# Patient Record
Sex: FEMALE | Race: WHITE | NOT HISPANIC OR LATINO | URBAN - METROPOLITAN AREA
[De-identification: names, ages, dates, MRNs, and addresses within clinical notes are randomized per-mention and may not be internally consistent; named-entity substitution may affect disease eponyms.]

---

## 2017-03-13 ENCOUNTER — OFFICE VISIT (OUTPATIENT)
Dept: FAMILY MEDICINE CLINIC | Facility: CLINIC | Age: 27
End: 2017-03-13

## 2017-03-13 VITALS
SYSTOLIC BLOOD PRESSURE: 110 MMHG | DIASTOLIC BLOOD PRESSURE: 70 MMHG | OXYGEN SATURATION: 98 % | HEART RATE: 110 BPM | WEIGHT: 124 LBS | TEMPERATURE: 98.4 F | BODY MASS INDEX: 22.82 KG/M2 | HEIGHT: 62 IN

## 2017-03-13 DIAGNOSIS — J30.89 ALLERGIC RHINITIS DUE TO OTHER ALLERGIC TRIGGER, UNSPECIFIED RHINITIS SEASONALITY: ICD-10-CM

## 2017-03-13 DIAGNOSIS — J02.9 SORE THROAT: Primary | ICD-10-CM

## 2017-03-13 DIAGNOSIS — J06.9 ACUTE URI: ICD-10-CM

## 2017-03-13 LAB
EXPIRATION DATE: NORMAL
EXPIRATION DATE: NORMAL
HETEROPH AB SER QL LA: NEGATIVE
INTERNAL CONTROL: NORMAL
INTERNAL CONTROL: NORMAL
Lab: NORMAL
Lab: NORMAL
S PYO AG THROAT QL: NEGATIVE

## 2017-03-13 PROCEDURE — 86308 HETEROPHILE ANTIBODY SCREEN: CPT | Performed by: FAMILY MEDICINE

## 2017-03-13 PROCEDURE — 87880 STREP A ASSAY W/OPTIC: CPT | Performed by: FAMILY MEDICINE

## 2017-03-13 PROCEDURE — 99213 OFFICE O/P EST LOW 20 MIN: CPT | Performed by: FAMILY MEDICINE

## 2017-03-13 RX ORDER — DULOXETIN HYDROCHLORIDE 60 MG/1
CAPSULE, DELAYED RELEASE ORAL
COMMUNITY
Start: 2017-03-09 | End: 2017-12-19

## 2017-03-13 NOTE — PROGRESS NOTES
Subjective   Jocelynn Montague is a 27 y.o. female.   Chief Complaint   Patient presents with   • Sore Throat       History of Present Illness     #1 sore throat for 10 days, on and off.  No fever, no chills associated.  Patient has nasal congestion and clear nasal drainage alternating with yellow drainage.  She has dry cough to clear her throat.  No other symptoms associated.  She tried Mucinex and Flonase as recommended in urgent care where she was checked one week ago.  It helped with her voice and a little with the drainage.    The following portions of the patient's history were reviewed and updated as appropriate: allergies, current medications, past medical history, past social history and problem list.    Review of Systems   Constitutional: Negative for chills and fever.   HENT: Positive for congestion and sore throat.    Respiratory: Positive for cough.    Cardiovascular: Negative.          Objective   Wt Readings from Last 3 Encounters:   03/13/17 124 lb (56.2 kg)   09/20/16 124 lb (56.2 kg)   09/13/16 125 lb (56.7 kg)      Vitals:    03/13/17 1107   BP: 110/70   Pulse: 110   Temp: 98.4 °F (36.9 °C)   SpO2: 98%     Temp Readings from Last 3 Encounters:   03/13/17 98.4 °F (36.9 °C)   09/20/16 98.3 °F (36.8 °C)   09/13/16 98 °F (36.7 °C)     BP Readings from Last 3 Encounters:   03/13/17 110/70   09/20/16 100/60   09/13/16 120/70     Pulse Readings from Last 3 Encounters:   03/13/17 110   09/20/16 92   09/13/16 85       Physical Exam   Constitutional: She is oriented to person, place, and time. She appears well-developed and well-nourished.   HENT:   Head: Normocephalic and atraumatic.   Right Ear: Tympanic membrane, external ear and ear canal normal.   Left Ear: Tympanic membrane, external ear and ear canal normal.   Nose: Mucosal edema and rhinorrhea present.   Mouth/Throat: No oropharyngeal exudate or posterior oropharyngeal erythema.   Neck: Neck supple.   Cardiovascular: Normal rate, regular rhythm and  normal heart sounds.    Pulmonary/Chest: Effort normal and breath sounds normal.   Neurological: She is alert and oriented to person, place, and time.   Skin: Skin is warm, dry and intact.       Assessment/Plan   Jocelynn was seen today for sore throat.    Diagnoses and all orders for this visit:    Sore throat  -     POCT Infectious mononucleosis antibody  -     POCT rapid strep A    Acute URI    Allergic rhinitis due to other allergic trigger, unspecified rhinitis seasonality        #1 URI/ AR- strep test and mono test negative.  Patient will continue Flonase.  She can add Zyrtec if needed.  Return to clinic if symptoms are not better in a few days, or sooner if worsening.

## 2017-09-05 RX ORDER — ERGOCALCIFEROL 1.25 MG/1
CAPSULE ORAL
Qty: 13 CAPSULE | Refills: 0 | OUTPATIENT
Start: 2017-09-05

## 2017-12-19 ENCOUNTER — OFFICE VISIT (OUTPATIENT)
Dept: INTERNAL MEDICINE | Facility: CLINIC | Age: 27
End: 2017-12-19

## 2017-12-19 VITALS
DIASTOLIC BLOOD PRESSURE: 64 MMHG | HEIGHT: 62 IN | WEIGHT: 125.38 LBS | BODY MASS INDEX: 23.07 KG/M2 | SYSTOLIC BLOOD PRESSURE: 128 MMHG | HEART RATE: 89 BPM | TEMPERATURE: 97.6 F | OXYGEN SATURATION: 97 %

## 2017-12-19 DIAGNOSIS — J32.9 SINUSITIS, UNSPECIFIED CHRONICITY, UNSPECIFIED LOCATION: Primary | ICD-10-CM

## 2017-12-19 PROCEDURE — 99213 OFFICE O/P EST LOW 20 MIN: CPT | Performed by: NURSE PRACTITIONER

## 2017-12-19 RX ORDER — AMOXICILLIN 875 MG/1
875 TABLET, COATED ORAL EVERY 12 HOURS SCHEDULED
Qty: 20 TABLET | Refills: 0 | Status: SHIPPED | OUTPATIENT
Start: 2017-12-19 | End: 2018-03-13

## 2017-12-19 RX ORDER — NORETHINDRONE ACETATE AND ETHINYL ESTRADIOL 1.5; 3 MG/1; UG/1
1 TABLET ORAL DAILY
COMMUNITY
Start: 2017-12-08 | End: 2018-05-01

## 2017-12-19 RX ORDER — SERTRALINE HYDROCHLORIDE 100 MG/1
2 TABLET, FILM COATED ORAL DAILY
Refills: 2 | COMMUNITY
Start: 2017-11-27

## 2017-12-19 NOTE — PROGRESS NOTES
Subjective   Jocelynn Montague is a 27 y.o. female. Patient is here today for   Chief Complaint   Patient presents with   • URI     Pt complains of having a productive cough & acute ST on and off for x2 weeks. Pt has been taking Mucinex OTC.    .    History of Present Illness   C/o chest congestion x 2 weeks associated with cough, sore throat, nasal congestion. She has tried Mucinex with minimal relief. Denies fever. She works at a . Her throat is feeling better a little better.     The following portions of the patient's history were reviewed and updated as appropriate: allergies, current medications, past family history, past medical history, past social history, past surgical history and problem list.    Review of Systems   Constitutional: Positive for fatigue.   HENT: Positive for congestion, postnasal drip and sore throat. Negative for sinus pressure.    Respiratory: Positive for cough. Negative for shortness of breath and wheezing.    Cardiovascular: Negative.        Objective   Vitals:    12/19/17 1056   BP: 128/64   Pulse: 89   Temp: 97.6 °F (36.4 °C)   SpO2: 97%     Physical Exam   Constitutional: Vital signs are normal. She appears well-developed and well-nourished.   HENT:   Right Ear: A middle ear effusion is present.   Left Ear: A middle ear effusion is present.   Mouth/Throat: Oropharynx is clear and moist and mucous membranes are normal.   Cardiovascular: Normal rate, regular rhythm and normal heart sounds.    Pulmonary/Chest: Effort normal and breath sounds normal.   Lymphadenopathy:     She has no cervical adenopathy.   Skin: Skin is warm, dry and intact.   Psychiatric: She has a normal mood and affect. Her speech is normal and behavior is normal. Thought content normal.   Nursing note and vitals reviewed.      Assessment/Plan   Jocelynn was seen today for uri.    Diagnoses and all orders for this visit:    Sinusitis, unspecified chronicity, unspecified location  -     amoxicillin (AMOXIL) 875  MG tablet; Take 1 tablet by mouth Every 12 (Twelve) Hours.

## 2018-03-13 ENCOUNTER — OFFICE VISIT (OUTPATIENT)
Dept: INTERNAL MEDICINE | Facility: CLINIC | Age: 28
End: 2018-03-13

## 2018-03-13 VITALS
TEMPERATURE: 98 F | DIASTOLIC BLOOD PRESSURE: 70 MMHG | BODY MASS INDEX: 23.74 KG/M2 | SYSTOLIC BLOOD PRESSURE: 120 MMHG | HEIGHT: 62 IN | WEIGHT: 129 LBS | OXYGEN SATURATION: 98 % | HEART RATE: 100 BPM

## 2018-03-13 DIAGNOSIS — J06.0 SORE THROAT AND LARYNGITIS: ICD-10-CM

## 2018-03-13 DIAGNOSIS — J06.9 ACUTE URI: Primary | ICD-10-CM

## 2018-03-13 LAB
EXPIRATION DATE: NORMAL
INTERNAL CONTROL: NORMAL
Lab: NORMAL
S PYO AG THROAT QL: NEGATIVE

## 2018-03-13 PROCEDURE — 99213 OFFICE O/P EST LOW 20 MIN: CPT | Performed by: FAMILY MEDICINE

## 2018-03-13 PROCEDURE — 87880 STREP A ASSAY W/OPTIC: CPT | Performed by: FAMILY MEDICINE

## 2018-03-20 ENCOUNTER — OFFICE VISIT (OUTPATIENT)
Dept: INTERNAL MEDICINE | Facility: CLINIC | Age: 28
End: 2018-03-20

## 2018-03-20 VITALS
BODY MASS INDEX: 23.74 KG/M2 | DIASTOLIC BLOOD PRESSURE: 70 MMHG | OXYGEN SATURATION: 98 % | HEART RATE: 90 BPM | WEIGHT: 129 LBS | HEIGHT: 62 IN | TEMPERATURE: 98.8 F | SYSTOLIC BLOOD PRESSURE: 110 MMHG

## 2018-03-20 DIAGNOSIS — M54.50 ACUTE BILATERAL LOW BACK PAIN WITHOUT SCIATICA: Primary | ICD-10-CM

## 2018-03-20 PROCEDURE — 99213 OFFICE O/P EST LOW 20 MIN: CPT | Performed by: FAMILY MEDICINE

## 2018-03-20 RX ORDER — NITROFURANTOIN 25; 75 MG/1; MG/1
CAPSULE ORAL
Refills: 0 | COMMUNITY
Start: 2018-03-18 | End: 2018-05-01

## 2018-03-20 NOTE — PROGRESS NOTES
Subjective   Jocelynn Montague is a 28 y.o. female.   Chief Complaint   Patient presents with   • Back Pain       History of Present Illness     #1 Lower back pain-started gradually a few days ago.  It is on and off, there was no known injury.  Pain is achy, intensity 4-5 out of 10.  It is nonradiating.  She used heating pad and it helped.  She used ibuprofen and it helped.  She is currently on Macrobid day #2.  She had suprapubic pain over last weekend, it was on and off.  It was associated with nausea, but no vomiting.  She did [employer] urine test which was positive for infection.  Her friend called in Macrobid for her.  She takes it as prescribed. Suprapubic pain has resolved.  She has no dysuria, no frequency.    The following portions of the patient's history were reviewed and updated as appropriate: allergies, current medications, past family history, past medical history, past social history, past surgical history and problem list.    Review of Systems   Constitutional: Negative.  Negative for chills and fever.   Respiratory: Negative.    Cardiovascular: Negative.    Genitourinary: Negative for difficulty urinating, dysuria and frequency.   Musculoskeletal: Positive for back pain.         Objective   Wt Readings from Last 3 Encounters:   03/20/18 58.5 kg (129 lb)   03/13/18 58.5 kg (129 lb)   12/19/17 56.9 kg (125 lb 6 oz)      Vitals:    03/20/18 1523   BP: 110/70   Pulse: 90   Temp: 98.8 °F (37.1 °C)   SpO2: 98%     Temp Readings from Last 3 Encounters:   03/20/18 98.8 °F (37.1 °C)   03/13/18 98 °F (36.7 °C)   12/19/17 97.6 °F (36.4 °C)     BP Readings from Last 3 Encounters:   03/20/18 110/70   03/13/18 120/70   12/19/17 128/64     Pulse Readings from Last 3 Encounters:   03/20/18 90   03/13/18 100   12/19/17 89       Physical Exam   Constitutional: She appears well-developed and well-nourished.   Neck: Neck supple.   Cardiovascular: Normal rate, regular rhythm and normal heart sounds.    Pulmonary/Chest:  Effort normal and breath sounds normal.   Musculoskeletal: Normal range of motion.        Lumbar back: Normal. She exhibits no tenderness and no deformity.   SLR negative BL.   Neurological: She has normal strength and normal reflexes.   Skin: Skin is warm, dry and intact. No lesion noted. No erythema.   Psychiatric: She has a normal mood and affect. Her behavior is normal.       Assessment/Plan   Jocelynn was seen today for back pain.    Diagnoses and all orders for this visit:    Acute bilateral low back pain without sciatica        #1 lower back pain-patient will continue heating pad.  She can use Tylenol.  I advised against ibuprofen, because she is trying to get pregnant at and her LMP was 35 days ago.  Patient prefer no pregnancy test today.  Return to clinic if symptoms are not resolved within a few days, or sooner if worsening.  She will complete treatment with Macrobid as prescribed.

## 2018-05-01 ENCOUNTER — OFFICE VISIT (OUTPATIENT)
Dept: INTERNAL MEDICINE | Facility: CLINIC | Age: 28
End: 2018-05-01

## 2018-05-01 VITALS
TEMPERATURE: 98.1 F | HEART RATE: 77 BPM | DIASTOLIC BLOOD PRESSURE: 70 MMHG | BODY MASS INDEX: 23.74 KG/M2 | WEIGHT: 129 LBS | HEIGHT: 62 IN | OXYGEN SATURATION: 99 % | SYSTOLIC BLOOD PRESSURE: 118 MMHG

## 2018-05-01 DIAGNOSIS — J02.9 SORE THROAT: Primary | ICD-10-CM

## 2018-05-01 DIAGNOSIS — J02.9 ACUTE PHARYNGITIS, UNSPECIFIED ETIOLOGY: ICD-10-CM

## 2018-05-01 LAB
EXPIRATION DATE: NORMAL
INTERNAL CONTROL: NORMAL
Lab: NORMAL
S PYO AG THROAT QL: NEGATIVE

## 2018-05-01 PROCEDURE — 99213 OFFICE O/P EST LOW 20 MIN: CPT | Performed by: FAMILY MEDICINE

## 2018-05-01 PROCEDURE — 87880 STREP A ASSAY W/OPTIC: CPT | Performed by: FAMILY MEDICINE

## 2018-05-01 NOTE — PROGRESS NOTES
Subjective   Jocelynn Montague is a 28 y.o. female.   Chief Complaint   Patient presents with   • Sore Throat       History of Present Illness     #1 Sore throat-on and off for a few weeks.  It lasts for hours.  No fever, no chills, no other symptoms associated.  No abdominal pain, no diarrhea.  Patient tried salt water gargling and Zyrtec and it helped. She noticed blisters on the left side yesterday.  Pt is 6 weeks pregnant.    The following portions of the patient's history were reviewed and updated as appropriate: allergies, current medications, past family history, past medical history, past social history and problem list.    Review of Systems   Constitutional: Negative for chills and fever.   HENT: Positive for sore throat. Negative for congestion, rhinorrhea and sinus pressure.    Respiratory: Negative for cough.          Objective   Wt Readings from Last 3 Encounters:   05/01/18 58.5 kg (129 lb)   03/20/18 58.5 kg (129 lb)   03/13/18 58.5 kg (129 lb)      Vitals:    05/01/18 1043   BP: 118/70   Pulse: 77   Temp: 98.1 °F (36.7 °C)   SpO2: 99%     Temp Readings from Last 3 Encounters:   05/01/18 98.1 °F (36.7 °C)   03/20/18 98.8 °F (37.1 °C)   03/13/18 98 °F (36.7 °C)     BP Readings from Last 3 Encounters:   05/01/18 118/70   03/20/18 110/70   03/13/18 120/70     Pulse Readings from Last 3 Encounters:   05/01/18 77   03/20/18 90   03/13/18 100       Physical Exam   Constitutional: She is oriented to person, place, and time. She appears well-developed and well-nourished.   HENT:   Head: Normocephalic and atraumatic.   Right Ear: Tympanic membrane, external ear and ear canal normal.   Left Ear: Tympanic membrane, external ear and ear canal normal.   Nose: Nose normal. No rhinorrhea.   Mouth/Throat: No oropharyngeal exudate or posterior oropharyngeal erythema.   Tiny blisters on lt side.   Neck: Neck supple.   Cardiovascular: Normal rate, regular rhythm and normal heart sounds.    Pulmonary/Chest: Effort normal  and breath sounds normal.   Lymphadenopathy:     She has no cervical adenopathy.   Neurological: She is alert and oriented to person, place, and time.   Skin: Skin is warm, dry and intact.       Assessment/Plan   Jocelynn was seen today for sore throat.    Diagnoses and all orders for this visit:    Sore throat  -     POCT rapid strep A  -     Throat / Upper Respiratory Culture - Swab, Throat    Acute pharyngitis, unspecified etiology        #1 Acute pharyngitis-strep test is negative.  Culture is pending.  Saltwater gargling, Tylenol as needed.  Patient is 6 weeks pregnant.

## 2018-05-04 LAB
BACTERIA SPEC RESP CULT: NORMAL
BACTERIA SPEC RESP CULT: NORMAL

## 2018-08-01 RX ORDER — NITROFURANTOIN 25; 75 MG/1; MG/1
100 CAPSULE ORAL 2 TIMES DAILY
Qty: 14 CAPSULE | Refills: 0 | Status: SHIPPED | OUTPATIENT
Start: 2018-08-01 | End: 2020-01-28

## 2018-10-03 RX ORDER — ERGOCALCIFEROL 1.25 MG/1
CAPSULE ORAL
Qty: 26 CAPSULE | Refills: 0 | OUTPATIENT
Start: 2018-10-03

## 2018-10-12 RX ORDER — ERGOCALCIFEROL 1.25 MG/1
CAPSULE ORAL
Qty: 26 CAPSULE | Refills: 0 | OUTPATIENT
Start: 2018-10-12

## 2018-10-15 RX ORDER — ERGOCALCIFEROL 1.25 MG/1
50000 CAPSULE ORAL WEEKLY
Qty: 13 CAPSULE | Refills: 3 | Status: SHIPPED | OUTPATIENT
Start: 2018-10-15 | End: 2019-11-11 | Stop reason: SDUPTHER

## 2019-03-12 ENCOUNTER — OFFICE VISIT (OUTPATIENT)
Dept: INTERNAL MEDICINE | Facility: CLINIC | Age: 29
End: 2019-03-12

## 2019-03-12 VITALS
DIASTOLIC BLOOD PRESSURE: 80 MMHG | TEMPERATURE: 98.4 F | HEIGHT: 62 IN | HEART RATE: 87 BPM | WEIGHT: 145 LBS | BODY MASS INDEX: 26.68 KG/M2 | OXYGEN SATURATION: 98 % | SYSTOLIC BLOOD PRESSURE: 120 MMHG

## 2019-03-12 DIAGNOSIS — J06.9 ACUTE URI: Primary | ICD-10-CM

## 2019-03-12 PROCEDURE — 99213 OFFICE O/P EST LOW 20 MIN: CPT | Performed by: FAMILY MEDICINE

## 2019-03-12 NOTE — PROGRESS NOTES
Subjective   Jocelynn Montague is a 29 y.o. female.   Chief Complaint   Patient presents with   • Sore Throat   • Nasal Congestion   • Cough   • Ear Fullness       History of Present Illness     #1  Symptoms started yesterday with sore throat,  postnasal drainage, nasal congestion and yellow nasal drainage.  Patient also has popping of ears, but no pain in her ears, she has a dry cough, no fever, no chills, no other symptoms associated.  Sore throat is gone today, she feels postnasal drainage.  Nothing makes it better or worse.  She teaches English.  She stayed home yesterday because she did not feel well.  She delivered baby boy 10 weeks ago.  Was a vaginal delivery without complications.  She had 6 weeks checkup done with her GYN.  She is breast-feeding.    The following portions of the patient's history were reviewed and updated as appropriate: allergies, current medications, past medical history, past social history and problem list.    Review of Systems   Constitutional: Negative for chills and fever.   HENT: Positive for congestion, postnasal drip and rhinorrhea.    Respiratory: Positive for cough. Negative for wheezing.    Cardiovascular: Negative.          Objective   Wt Readings from Last 3 Encounters:   03/12/19 65.8 kg (145 lb)   05/01/18 58.5 kg (129 lb)   03/20/18 58.5 kg (129 lb)      Vitals:    03/12/19 0944   BP: 120/80   Pulse: 87   Temp: 98.4 °F (36.9 °C)   SpO2: 98%     Temp Readings from Last 3 Encounters:   03/12/19 98.4 °F (36.9 °C)   05/01/18 98.1 °F (36.7 °C)   03/20/18 98.8 °F (37.1 °C)     BP Readings from Last 3 Encounters:   03/12/19 120/80   05/01/18 118/70   03/20/18 110/70     Pulse Readings from Last 3 Encounters:   03/12/19 87   05/01/18 77   03/20/18 90       Physical Exam   Constitutional: She is oriented to person, place, and time. She appears well-developed and well-nourished.   HENT:   Head: Normocephalic and atraumatic.   Right Ear: Tympanic membrane and ear canal normal.   Left  Ear: Tympanic membrane and ear canal normal.   Nose: Rhinorrhea present.   Mouth/Throat: No oropharyngeal exudate or posterior oropharyngeal erythema.   Neck: Neck supple.   Cardiovascular: Normal rate, regular rhythm and normal heart sounds.   Pulmonary/Chest: Effort normal and breath sounds normal.   Lymphadenopathy:     She has no cervical adenopathy.   Neurological: She is alert and oriented to person, place, and time.   Skin: Skin is warm, dry and intact.   Psychiatric: She has a normal mood and affect. Her behavior is normal.       Assessment/Plan   Jocelynn was seen today for sore throat, nasal congestion, cough and ear fullness.    Diagnoses and all orders for this visit:    Acute URI        #1 URI-symptomatic treatment.  Normal saline drops.  Tylenol if needed.  Patient is breast-feeding.  Return to clinic symptoms are not resolved in a few days, or sooner if worsening.  Work excuse for yesterday, today and tomorrow.

## 2019-11-11 RX ORDER — ERGOCALCIFEROL 1.25 MG/1
CAPSULE ORAL
Qty: 13 CAPSULE | Refills: 0 | Status: SHIPPED | OUTPATIENT
Start: 2019-11-11 | End: 2020-07-14

## 2020-01-28 ENCOUNTER — OFFICE VISIT (OUTPATIENT)
Dept: INTERNAL MEDICINE | Facility: CLINIC | Age: 30
End: 2020-01-28

## 2020-01-28 VITALS
TEMPERATURE: 98.5 F | DIASTOLIC BLOOD PRESSURE: 64 MMHG | HEIGHT: 62 IN | OXYGEN SATURATION: 100 % | BODY MASS INDEX: 24.11 KG/M2 | WEIGHT: 131 LBS | SYSTOLIC BLOOD PRESSURE: 136 MMHG | HEART RATE: 98 BPM

## 2020-01-28 DIAGNOSIS — M54.50 LOW BACK PAIN WITHOUT SCIATICA, UNSPECIFIED BACK PAIN LATERALITY, UNSPECIFIED CHRONICITY: Primary | ICD-10-CM

## 2020-01-28 LAB
BILIRUB BLD-MCNC: NEGATIVE MG/DL
CLARITY, POC: CLEAR
COLOR UR: YELLOW
GLUCOSE UR STRIP-MCNC: NEGATIVE MG/DL
KETONES UR QL: NEGATIVE
LEUKOCYTE EST, POC: NEGATIVE
NITRITE UR-MCNC: NEGATIVE MG/ML
PH UR: 5.5 [PH] (ref 5–8)
PROT UR STRIP-MCNC: NEGATIVE MG/DL
RBC # UR STRIP: NEGATIVE /UL
SP GR UR: 1 (ref 1–1.03)
UROBILINOGEN UR QL: NORMAL

## 2020-01-28 PROCEDURE — 81003 URINALYSIS AUTO W/O SCOPE: CPT | Performed by: FAMILY MEDICINE

## 2020-01-28 PROCEDURE — 99213 OFFICE O/P EST LOW 20 MIN: CPT | Performed by: FAMILY MEDICINE

## 2020-01-28 NOTE — PROGRESS NOTES
Subjective   Jocelynn Montague is a 30 y.o. female.   Chief Complaint   Patient presents with   • Back Pain       History of Present Illness     #1  Lower back pain- started 2 days ago.  No known injury.  It is sharp pain, on and off, lasting seconds.  Intensity 5 out of 10.  It is not radiating.  There is no numbness, no tingling associated.  It is better when she walks more.  Tylenol helps.  She also has pelvic pain which was similar to back pain -sharp, on and off, lasting seconds.  She had urine checked at another office and culture was negative.  Prior to the onset of back pain she started a new, more intense exercise program.  She does not do stretching prior to it.    She had a baby 13 months ago.  She still breast-feeds.  She did not start having periods yet.  She is on progesterone prescribed by hormone specialist.    The following portions of the patient's history were reviewed and updated as appropriate: allergies, current medications, past medical history, past social history and problem list.    Review of Systems   Constitutional: Negative.  Negative for fever.   HENT: Positive for rhinorrhea.    Respiratory: Negative.    Cardiovascular: Negative.    Musculoskeletal: Positive for back pain.   Neurological: Negative for weakness and numbness.         Objective   Wt Readings from Last 3 Encounters:   01/28/20 59.4 kg (131 lb)   03/12/19 65.8 kg (145 lb)   05/01/18 58.5 kg (129 lb)      Vitals:    01/28/20 1420   BP: 136/64   Pulse: 98   Temp: 98.5 °F (36.9 °C)   SpO2: 100%     Temp Readings from Last 3 Encounters:   01/28/20 98.5 °F (36.9 °C)   03/12/19 98.4 °F (36.9 °C)   05/01/18 98.1 °F (36.7 °C)     BP Readings from Last 3 Encounters:   01/28/20 136/64   03/12/19 120/80   05/01/18 118/70     Pulse Readings from Last 3 Encounters:   01/28/20 98   03/12/19 87   05/01/18 77     Body mass index is 23.95 kg/m².    Physical Exam   Constitutional: She appears well-developed and well-nourished.   HENT:   Right  Ear: Tympanic membrane, external ear and ear canal normal.   Left Ear: Tympanic membrane, external ear and ear canal normal.   Nose: Nose normal.   Mouth/Throat: No posterior oropharyngeal erythema.   Neck: Neck supple.   Cardiovascular: Normal rate, regular rhythm and normal heart sounds.   Pulmonary/Chest: Effort normal and breath sounds normal.   Abdominal: Soft. She exhibits no distension and no mass. There is no tenderness. There is no guarding.   Musculoskeletal: Normal range of motion.        Lumbar back: Normal. She exhibits no tenderness and no deformity.   SLR negative BL.   Neurological: She has normal strength and normal reflexes.   Skin: Skin is warm, dry and intact. No lesion noted. No erythema.       Assessment/Plan   Jocelynn was seen today for back pain.    Diagnoses and all orders for this visit:    Low back pain without sciatica, unspecified back pain laterality, unspecified chronicity  -     POCT urinalysis dipstick, automated        #1 lower back pain-urine dip and pregnancy test in the office are negative.  Take Tylenol as needed.  I recommend stretching prior to exercise.  Return to clinic symptoms are not resolved in a few days, or sooner if worsening.

## 2020-07-14 ENCOUNTER — OFFICE VISIT (OUTPATIENT)
Dept: INTERNAL MEDICINE | Facility: CLINIC | Age: 30
End: 2020-07-14

## 2020-07-14 VITALS
SYSTOLIC BLOOD PRESSURE: 108 MMHG | HEART RATE: 62 BPM | TEMPERATURE: 97.9 F | WEIGHT: 129 LBS | BODY MASS INDEX: 23.74 KG/M2 | OXYGEN SATURATION: 99 % | HEIGHT: 62 IN | DIASTOLIC BLOOD PRESSURE: 70 MMHG

## 2020-07-14 DIAGNOSIS — M77.9 TENDONITIS: Primary | ICD-10-CM

## 2020-07-14 PROCEDURE — 99213 OFFICE O/P EST LOW 20 MIN: CPT | Performed by: NURSE PRACTITIONER

## 2020-07-14 RX ORDER — LEVOTHYROXINE SODIUM 0.1 MG/1
1 TABLET ORAL DAILY
COMMUNITY
Start: 2020-07-13 | End: 2023-02-05

## 2020-07-14 RX ORDER — PREDNISONE 10 MG/1
TABLET ORAL
Qty: 21 TABLET | Refills: 0 | Status: SHIPPED | OUTPATIENT
Start: 2020-07-14 | End: 2021-10-06

## 2021-04-16 ENCOUNTER — BULK ORDERING (OUTPATIENT)
Dept: CASE MANAGEMENT | Facility: OTHER | Age: 31
End: 2021-04-16

## 2021-04-16 DIAGNOSIS — Z23 IMMUNIZATION DUE: ICD-10-CM

## 2021-10-06 ENCOUNTER — OFFICE VISIT (OUTPATIENT)
Dept: INTERNAL MEDICINE | Facility: CLINIC | Age: 31
End: 2021-10-06

## 2021-10-06 VITALS
BODY MASS INDEX: 26.87 KG/M2 | TEMPERATURE: 97.7 F | OXYGEN SATURATION: 99 % | HEART RATE: 63 BPM | HEIGHT: 62 IN | WEIGHT: 146 LBS | SYSTOLIC BLOOD PRESSURE: 120 MMHG | DIASTOLIC BLOOD PRESSURE: 76 MMHG

## 2021-10-06 DIAGNOSIS — H93.8X2 CONGESTION OF LEFT EAR: Primary | ICD-10-CM

## 2021-10-06 DIAGNOSIS — J30.2 SEASONAL ALLERGIC RHINITIS, UNSPECIFIED TRIGGER: ICD-10-CM

## 2021-10-06 PROCEDURE — 99213 OFFICE O/P EST LOW 20 MIN: CPT | Performed by: FAMILY MEDICINE

## 2021-10-06 NOTE — PROGRESS NOTES
Subjective   Jocelynn Montague is a 31 y.o. female.   Chief Complaint   Patient presents with   • Ear Fullness     Left ear is Clogged        History of Present Illness     #1 Left ear congestion and pressure-symptoms started about 3 days ago. They are on and off. Worse at night. There is no ear pain, no drainage from  ear. No other symptoms associated. She has mild postnasal drainage, but no congestion, no sneezing, no runny nose, no cough. She did not try anything yet to make it better. She is breast-feeding.  She has seasonal allergies. Symptoms are usually worse in spring and fall. Currently on no medications.    The following portions of the patient's history were reviewed and updated as appropriate: allergies, current medications, past medical history, past social history and problem list.    Review of Systems   Constitutional: Negative for chills and fever.   HENT: Positive for postnasal drip. Negative for congestion, ear discharge, ear pain, rhinorrhea, sneezing, sore throat and tinnitus.    Respiratory: Negative for cough.          Objective   Wt Readings from Last 3 Encounters:   10/06/21 66.2 kg (146 lb)   07/14/20 58.5 kg (129 lb)   01/28/20 59.4 kg (131 lb)      Vitals:    10/06/21 0946   BP: 120/76   Pulse: 63   Temp: 97.7 °F (36.5 °C)   SpO2: 99%     Temp Readings from Last 3 Encounters:   10/06/21 97.7 °F (36.5 °C)   07/14/20 97.9 °F (36.6 °C)   01/28/20 98.5 °F (36.9 °C)     BP Readings from Last 3 Encounters:   10/06/21 120/76   07/14/20 108/70   01/28/20 136/64     Pulse Readings from Last 3 Encounters:   10/06/21 63   07/14/20 62   01/28/20 98     Body mass index is 26.7 kg/m².    Physical Exam  Constitutional:       Appearance: Normal appearance.   HENT:      Right Ear: Tympanic membrane, ear canal and external ear normal.      Left Ear: External ear normal.      Ears:      Comments: Wax in left canal obstructing it completely.      Nose: Nose normal. No mucosal edema or rhinorrhea.       Mouth/Throat:      Pharynx: Oropharynx is clear. No posterior oropharyngeal erythema.   Cardiovascular:      Heart sounds: Normal heart sounds. No murmur heard.     Pulmonary:      Effort: Pulmonary effort is normal.      Breath sounds: Normal breath sounds.   Lymphadenopathy:      Cervical: No cervical adenopathy.   Neurological:      Mental Status: She is alert.         Assessment/Plan   Diagnoses and all orders for this visit:    1. Congestion of left ear (Primary)    2. Seasonal allergic rhinitis, unspecified trigger        #1 left ear congestion/#2 allergic rhinitis/ postnasal drainage -we were not able to irrigate wax.  I am referring patient to ENT.  She can use Flonase as needed for postnasal drainage.

## 2023-02-05 ENCOUNTER — TELEMEDICINE (OUTPATIENT)
Dept: FAMILY MEDICINE CLINIC | Facility: TELEHEALTH | Age: 33
End: 2023-02-05
Payer: COMMERCIAL

## 2023-02-05 DIAGNOSIS — J02.0 STREP THROAT: Primary | ICD-10-CM

## 2023-02-05 PROCEDURE — 99213 OFFICE O/P EST LOW 20 MIN: CPT | Performed by: NURSE PRACTITIONER

## 2023-02-05 RX ORDER — BUSPIRONE HYDROCHLORIDE 5 MG/1
TABLET ORAL
COMMUNITY
Start: 2023-02-02 | End: 2023-02-28

## 2023-02-05 RX ORDER — AMOXICILLIN 875 MG/1
875 TABLET, COATED ORAL 2 TIMES DAILY
Qty: 20 TABLET | Refills: 0 | Status: SHIPPED | OUTPATIENT
Start: 2023-02-05 | End: 2023-02-15

## 2023-02-05 RX ORDER — LEVOTHYROXINE AND LIOTHYRONINE 57; 13.5 UG/1; UG/1
TABLET ORAL
COMMUNITY

## 2023-02-05 NOTE — PROGRESS NOTES
You have chosen to receive care through a telehealth visit.  Do you consent to use a video/audio connection for your medical care today? Yes     CHIEF COMPLAINT  No chief complaint on file.        HPI  Jocelynn Montague is a 33 y.o. female  presents with complaint of few days history of sore throat with fatigue, white patches and redness on back of throat, swollen lymph nodes in front of neck.  Denies fever, body aches, chills.  Pre- exposed to strep throat.  All 3 of her kids had strep last week.     Review of Systems   See HPI    Past Medical History:   Diagnosis Date   • Depression    • Hashimoto's thyroiditis    • Vitamin D deficiency        Family History   Problem Relation Age of Onset   • Depression Mother    • Hypertension Mother    • Depression Father    • Nephrolithiasis Father    • Hypertension Paternal Grandmother        Social History     Socioeconomic History   • Marital status:    Tobacco Use   • Smoking status: Never   • Smokeless tobacco: Never   Vaping Use   • Vaping Use: Never used   Substance and Sexual Activity   • Alcohol use: No   • Sexual activity: Defer       Jocelynn Montague  reports that she has never smoked. She has never used smokeless tobacco..              There were no vitals taken for this visit.    PHYSICAL EXAM  Physical Exam   Constitutional: She is oriented to person, place, and time. She appears well-developed and well-nourished. She does not have a sickly appearance. She does not appear ill.   HENT:   Head: Normocephalic and atraumatic.   See attached photos of oropharynx   Pulmonary/Chest: Effort normal.  No respiratory distress.  Neurological: She is alert and oriented to person, place, and time.         Diagnoses and all orders for this visit:    1. Strep throat (Primary)  -     amoxicillin (AMOXIL) 875 MG tablet; Take 1 tablet by mouth 2 (Two) Times a Day for 10 days.  Dispense: 20 tablet; Refill: 0    --take medications as prescribed  --increase fluids,  rest as needed, tylenol or ibuprofen for pain  --f/u in 3-5 days if no improvement        FOLLOW-UP  As discussed during visit with PCP/[facility] if no improvement or Urgent Care/Emergency Department if worsening of symptoms    Patient verbalizes understanding of medication dosage, comfort measures, instructions for treatment and follow-up.    Elsa Ureña, APRN  02/05/2023  16:21 EST    The use of a video visit has been reviewed with the patient and verbal informed consent has been obtained. Myself and Jocelynn Montague participated in this visit. The patient is located in [address] [place] [address].    I am located in [place], [place]. [employer] and [employer] were utilized. I spent 8 minutes in the patient's chart for this visit.

## 2023-02-28 ENCOUNTER — TELEMEDICINE (OUTPATIENT)
Dept: FAMILY MEDICINE CLINIC | Facility: TELEHEALTH | Age: 33
End: 2023-02-28
Payer: COMMERCIAL

## 2023-02-28 VITALS — HEIGHT: 62 IN | BODY MASS INDEX: 23.92 KG/M2 | WEIGHT: 130 LBS

## 2023-02-28 DIAGNOSIS — J02.0 STREP PHARYNGITIS: Primary | ICD-10-CM

## 2023-02-28 PROCEDURE — 99213 OFFICE O/P EST LOW 20 MIN: CPT | Performed by: NURSE PRACTITIONER

## 2023-02-28 RX ORDER — AMOXICILLIN 875 MG/1
875 TABLET, COATED ORAL 2 TIMES DAILY
Qty: 20 TABLET | Refills: 0 | Status: SHIPPED | OUTPATIENT
Start: 2023-02-28 | End: 2023-03-10

## 2023-02-28 NOTE — PROGRESS NOTES
"You have chosen to receive care through a telehealth visit.  Do you consent to use a video/audio connection for your medical care today? Yes     CHIEF COMPLAINT  Cc: sore throat    HPI  Jocelynn Montague is a 33 y.o. female  presents with complaint of sore throat. She reports that she thinks that she has strep again. All three of her children tested positive yesterday. She reports sore red throat with white patches. She has uploaded images. Additional symptoms that she is having are noted in the ROS portion of this visit.    Review of Systems   Constitutional: Positive for fatigue. Negative for fever.   HENT: Positive for rhinorrhea and sore throat. Trouble swallowing: hurts to swallow.    Respiratory: Negative for cough.    Gastrointestinal: Positive for diarrhea and nausea.   Skin: Positive for rash (face).       Past Medical History:   Diagnosis Date   • Depression    • Hashimoto's thyroiditis    • Vitamin D deficiency        Family History   Problem Relation Age of Onset   • Depression Mother    • Hypertension Mother    • Depression Father    • Nephrolithiasis Father    • Hypertension Paternal Grandmother        Social History     Socioeconomic History   • Marital status:    Tobacco Use   • Smoking status: Never   • Smokeless tobacco: Never   Vaping Use   • Vaping Use: Never used   Substance and Sexual Activity   • Alcohol use: No   • Sexual activity: Defer       Jocelynn Montague  reports that she has never smoked. She has never used smokeless tobacco..    Ht 157.5 cm (62\")   Wt 59 kg (130 lb)   Breastfeeding Yes   BMI 23.78 kg/m²     PHYSICAL EXAM  Physical Exam   Constitutional: She is oriented to person, place, and time. She appears well-developed and well-nourished.   HENT:   Head: Normocephalic and atraumatic.   Right Ear: External ear normal.   Left Ear: External ear normal.   Nose: Nose normal.   Mouth/Throat: Mucous membranes are erythematous. white patches.  Eyes: Lids are normal. Right eye " exhibits no discharge and no exudate. Left eye exhibits no discharge and no exudate. Right conjunctiva is not injected. Left conjunctiva is not injected.   Pulmonary/Chest: No accessory muscle usage. No tachypnea and no bradypnea.  No respiratory distress.No use of oxygen by nasal cannulaNo use of oxygen by mask noted.  Neurological: She is alert and oriented to person, place, and time. No cranial nerve deficit.   Skin: Her skin appears normal.  Psychiatric: She has a normal mood and affect. Her speech is normal and behavior is normal. Judgment and thought content normal.       Results for orders placed or performed in visit on 01/28/20   POCT urinalysis dipstick, automated    Specimen: Urine   Result Value Ref Range    Color Yellow Yellow, Straw, Dark Yellow, Leticia    Clarity, UA Clear Clear    Specific Gravity  1.005 1.005 - 1.030    pH, Urine 5.5 5.0 - 8.0    Leukocytes Negative Negative    Nitrite, UA Negative Negative    Protein, POC Negative Negative mg/dL    Glucose, UA Negative Negative, 1000 mg/dL (3+) mg/dL    Ketones, UA Negative Negative    Urobilinogen, UA Normal Normal    Bilirubin Negative Negative    Blood, UA Negative Negative       Diagnoses and all orders for this visit:    1. Strep pharyngitis (Primary)    Other orders  -     amoxicillin (AMOXIL) 875 MG tablet; Take 1 tablet by mouth 2 (Two) Times a Day for 10 days.  Dispense: 20 tablet; Refill: 0    Alternate tylenol and ibuprofen for pain or fever  Hydrate well  May gargle with warm salt water  May try hot tea with lemon and honey  Probiotics for two weeks related to taking antibiotics. The pharmacist can help you with this if needed. Do not take within two hours of antibiotic.      FOLLOW-UP  If symptoms worsen or persist follow up with PCP, [facility] or Urgent Care    Patient verbalizes understanding of medication dosage, comfort measures, instructions for treatment and follow-up.    Ameena Fang, BETITO  02/28/2023  10:57 EST    The use of  a video visit has been reviewed with the patient and verbal informed consent has been obtained. Myself and Jocelynn Montague participated in this visit. The patient is located in [address] [place] [address].    I am located in [place], [place]. [employer] and [employer] were utilized. I spent 20 minutes in the patient's chart for this visit.

## 2023-11-20 ENCOUNTER — TELEMEDICINE (OUTPATIENT)
Dept: FAMILY MEDICINE CLINIC | Facility: TELEHEALTH | Age: 33
End: 2023-11-20
Payer: COMMERCIAL

## 2023-11-20 DIAGNOSIS — B34.9 VIRAL ILLNESS: Primary | ICD-10-CM

## 2023-11-20 NOTE — PROGRESS NOTES
Subjective   Jocelynn Montague is a 33 y.o. female.     History of Present Illness  She has swollen lymph nodes, body aches, trouble sleeping, sneezing, coughing. She is 15 weeks pregnant. Her symptoms started 5 days ago with congestion. On Saturday she started having body aches and unable to sleep. She got a flu shot this year. Her son had similar symptoms but never was tested.        The following portions of the patient's history were reviewed and updated as appropriate: allergies, current medications, past family history, past medical history, past social history, past surgical history, and problem list.    Review of Systems   Constitutional:  Positive for chills and diaphoresis. Negative for fever.   HENT:  Positive for congestion, postnasal drip, rhinorrhea, sore throat and swollen glands. Ear pain: ear clogged.   Respiratory:  Positive for cough. Negative for shortness of breath and wheezing.    Gastrointestinal:  Negative for diarrhea, nausea and vomiting.   Musculoskeletal:  Positive for myalgias.       Objective   Physical Exam  Constitutional:       General: She is not in acute distress.     Appearance: She is well-developed. She is ill-appearing. She is not diaphoretic.   Pulmonary:      Effort: Pulmonary effort is normal.   Neurological:      Mental Status: She is alert and oriented to person, place, and time.   Psychiatric:         Behavior: Behavior normal.           Assessment & Plan   Diagnoses and all orders for this visit:    1. Viral illness (Primary)          Consult list of approved OTC products given by OB       The use of a video visit has been reviewed with the patient and verbal informed consent has been obtained. Myself and Jocelynn Montague participated in this visit. The patient is located in [place], [place] at her home. I am located in [place], [place]. [employer] and [employer] were utilized. I spent 14 minutes in the patient's chart for this visit.

## 2023-11-20 NOTE — PATIENT INSTRUCTIONS
Push fluids, rest  Tylenol/ibuprofen for pain or fever>101  Saline nasal spray/irrigation, humidified air for sinus symptoms  Flonase, mucinex with a full glass of water for congestion  Do not suppress your cough unless it prevents you from resting  Follow up if symptoms worsen or do not improve in 3-4 days.

## 2024-05-29 NOTE — PROGRESS NOTES
Subjective   Jocelynn Montague is a 28 y.o. female.   Chief Complaint   Patient presents with   • Sore Throat       History of Present Illness     #1 Sore throat and raspy voice started 2 days ago.  There is no fever, no chills associated.  Patient has nasal drainage and nasal congestion.  She is not sure about the color of the drainage.  She has cough which is mostly dry.  She uses Zyrtec but it didn't help much.  No other symptoms associated.    The following portions of the patient's history were reviewed and updated as appropriate: allergies, current medications, past medical history, past social history and problem list.    Review of Systems   Constitutional: Negative for chills and fever.   HENT: Positive for congestion, postnasal drip, rhinorrhea and sore throat.    Respiratory: Positive for cough. Negative for wheezing.    Cardiovascular: Negative.          Objective   Wt Readings from Last 3 Encounters:   03/13/18 58.5 kg (129 lb)   12/19/17 56.9 kg (125 lb 6 oz)   03/13/17 56.2 kg (124 lb)      Vitals:    03/13/18 1200   BP: 120/70   Pulse: 100   Temp: 98 °F (36.7 °C)   SpO2: 98%     Temp Readings from Last 3 Encounters:   03/13/18 98 °F (36.7 °C)   12/19/17 97.6 °F (36.4 °C)   03/13/17 98.4 °F (36.9 °C)     BP Readings from Last 3 Encounters:   03/13/18 120/70   12/19/17 128/64   03/13/17 110/70     Pulse Readings from Last 3 Encounters:   03/13/18 100   12/19/17 89   03/13/17 110       Physical Exam   Constitutional: She is oriented to person, place, and time. She appears well-developed and well-nourished.   HENT:   Head: Normocephalic and atraumatic.   Right Ear: Tympanic membrane, external ear and ear canal normal.   Left Ear: Tympanic membrane, external ear and ear canal normal.   Nose: Rhinorrhea present.   Mouth/Throat: Oropharynx is clear and moist. No posterior oropharyngeal erythema.   Neck: Neck supple.   Cardiovascular: Normal rate, regular rhythm and normal heart sounds.    Pulmonary/Chest:  Effort normal and breath sounds normal.   Lymphadenopathy:     She has no cervical adenopathy.   Neurological: She is alert and oriented to person, place, and time.   Skin: Skin is warm, dry and intact.   Psychiatric: Her behavior is normal. Thought content normal.       Assessment/Plan   Jocelynn was seen today for sore throat.    Diagnoses and all orders for this visit:    Acute URI    Sore throat and laryngitis  -     POCT rapid strep A        #1 URI- strep test is negative.  Symptomatic treatment.  Tylenol/Mucinex.  Return to clinic symptoms are not resolved jim few days with treatment, or sooner if worsening.        normal...

## 2025-07-08 ENCOUNTER — PATIENT ROUNDING (BHMG ONLY) (OUTPATIENT)
Dept: URGENT CARE | Facility: CLINIC | Age: 35
End: 2025-07-08
Payer: COMMERCIAL

## 2025-07-08 NOTE — ED NOTES
Thank you for letting us care for you in your recent visit to our urgent care center. We would love to hear about your experience with us. Was this the first time you have visited our location?    We’re always looking for ways to make our patients’ experiences even better. Do you have any recommendations on ways we may improve?     I appreciate you taking the time to respond. Please be on the lookout for a survey about your recent visit from [employer] via text or email. We would greatly appreciate if you could fill that out and turn it back in. We want your voice to be heard and we value your feedback.   Thank you for choosing [facility] for your healthcare needs.